# Patient Record
Sex: MALE | Race: WHITE
[De-identification: names, ages, dates, MRNs, and addresses within clinical notes are randomized per-mention and may not be internally consistent; named-entity substitution may affect disease eponyms.]

---

## 2019-04-28 ENCOUNTER — HOSPITAL ENCOUNTER (EMERGENCY)
Dept: HOSPITAL 91 - E/R | Age: 64
Discharge: HOME | End: 2019-04-28
Payer: MEDICAID

## 2019-04-28 ENCOUNTER — HOSPITAL ENCOUNTER (EMERGENCY)
Dept: HOSPITAL 10 - E/R | Age: 64
Discharge: HOME | End: 2019-04-28
Payer: MEDICAID

## 2019-04-28 VITALS — SYSTOLIC BLOOD PRESSURE: 125 MMHG | HEART RATE: 65 BPM | DIASTOLIC BLOOD PRESSURE: 86 MMHG | RESPIRATION RATE: 16 BRPM

## 2019-04-28 VITALS — WEIGHT: 145.31 LBS | BODY MASS INDEX: 28.53 KG/M2 | HEIGHT: 60 IN

## 2019-04-28 DIAGNOSIS — I95.9: Primary | ICD-10-CM

## 2019-04-28 LAB
ADD MAN DIFF?: NO
ALANINE AMINOTRANSFERASE: 18 IU/L (ref 13–69)
ALBUMIN/GLOBULIN RATIO: 1.26
ALBUMIN: 3.8 G/DL (ref 3.3–4.9)
ALKALINE PHOSPHATASE: 82 IU/L (ref 42–121)
ANION GAP: 9 (ref 5–13)
ASPARTATE AMINO TRANSFERASE: 18 IU/L (ref 15–46)
BASOPHIL #: 0.1 10^3/UL (ref 0–0.1)
BASOPHILS %: 0.6 % (ref 0–2)
BILIRUBIN,DIRECT: 0 MG/DL (ref 0–0.2)
BILIRUBIN,TOTAL: 0.3 MG/DL (ref 0.2–1.3)
BLOOD UREA NITROGEN: 13 MG/DL (ref 7–20)
CALCIUM: 9 MG/DL (ref 8.4–10.2)
CARBON DIOXIDE: 25 MMOL/L (ref 21–31)
CHLORIDE: 106 MMOL/L (ref 97–110)
CREATININE: 1.03 MG/DL (ref 0.61–1.24)
EOSINOPHILS #: 0.9 10^3/UL (ref 0–0.5)
EOSINOPHILS %: 6.4 % (ref 0–7)
GLOBULIN: 3 G/DL (ref 1.3–3.2)
GLUCOSE: 190 MG/DL (ref 70–220)
HEMATOCRIT: 36 % (ref 42–52)
HEMOGLOBIN: 12 G/DL (ref 14–18)
IMMATURE GRANS #M: 0.04 10^3/UL (ref 0–0.03)
IMMATURE GRANS % (M): 0.3 % (ref 0–0.43)
INR: 0.99
LYMPHOCYTES #: 4.7 10^3/UL (ref 0.8–2.9)
LYMPHOCYTES %: 35.1 % (ref 15–51)
MEAN CORPUSCULAR HEMOGLOBIN: 28.7 PG (ref 29–33)
MEAN CORPUSCULAR HGB CONC: 33.3 G/DL (ref 32–37)
MEAN CORPUSCULAR VOLUME: 86.1 FL (ref 82–101)
MEAN PLATELET VOLUME: 9.3 FL (ref 7.4–10.4)
MONOCYTE #: 0.9 10^3/UL (ref 0.3–0.9)
MONOCYTES %: 6.7 % (ref 0–11)
NEUTROPHIL #: 6.8 10^3/UL (ref 1.6–7.5)
NEUTROPHILS %: 50.9 % (ref 39–77)
NUCLEATED RED BLOOD CELLS #: 0 10^3/UL (ref 0–0)
NUCLEATED RED BLOOD CELLS%: 0 /100WBC (ref 0–0)
PARTIAL THROMBOPLASTIN TIME: 21.9 SEC (ref 23–35)
PLATELET COUNT: 278 10^3/UL (ref 140–415)
POTASSIUM: 3.6 MMOL/L (ref 3.5–5.1)
PROTIME: 13.2 SEC (ref 11.9–14.9)
PT RATIO: 1
RED BLOOD COUNT: 4.18 10^6/UL (ref 4.7–6.1)
RED CELL DISTRIBUTION WIDTH: 13.2 % (ref 11.5–14.5)
SODIUM: 140 MMOL/L (ref 135–144)
TOTAL PROTEIN: 6.8 G/DL (ref 6.1–8.1)
TROPONIN-I: < 0.012 NG/ML (ref 0–0.12)
WHITE BLOOD COUNT: 13.4 10^3/UL (ref 4.8–10.8)

## 2019-04-28 PROCEDURE — 36415 COLL VENOUS BLD VENIPUNCTURE: CPT

## 2019-04-28 PROCEDURE — 85025 COMPLETE CBC W/AUTO DIFF WBC: CPT

## 2019-04-28 PROCEDURE — 96374 THER/PROPH/DIAG INJ IV PUSH: CPT

## 2019-04-28 PROCEDURE — 71045 X-RAY EXAM CHEST 1 VIEW: CPT

## 2019-04-28 PROCEDURE — 85730 THROMBOPLASTIN TIME PARTIAL: CPT

## 2019-04-28 PROCEDURE — 80053 COMPREHEN METABOLIC PANEL: CPT

## 2019-04-28 PROCEDURE — 70450 CT HEAD/BRAIN W/O DYE: CPT

## 2019-04-28 PROCEDURE — 93005 ELECTROCARDIOGRAM TRACING: CPT

## 2019-04-28 PROCEDURE — 84484 ASSAY OF TROPONIN QUANT: CPT

## 2019-04-28 PROCEDURE — 85610 PROTHROMBIN TIME: CPT

## 2019-04-28 PROCEDURE — 99285 EMERGENCY DEPT VISIT HI MDM: CPT

## 2019-04-28 RX ADMIN — ALBUTEROL SULFATE 1 MG: 2.5 SOLUTION RESPIRATORY (INHALATION) at 15:33

## 2019-04-28 RX ADMIN — ONDANSETRON HYDROCHLORIDE 1 MG: 2 INJECTION, SOLUTION INTRAMUSCULAR; INTRAVENOUS at 14:37

## 2019-04-28 RX ADMIN — IPRATROPIUM BROMIDE 1 MG: 0.5 SOLUTION RESPIRATORY (INHALATION) at 15:33

## 2019-04-28 RX ADMIN — THIAMINE HYDROCHLORIDE 1 MLS/HR: 100 INJECTION, SOLUTION INTRAMUSCULAR; INTRAVENOUS at 14:38

## 2019-04-28 NOTE — ERD
ER Documentation


Chief Complaint


Chief Complaint





syncope at home s/p 2 teeth extractions





HPI


This is a 63-year-old male that presented to the emergency department after he 


had a brief transient loss of consciousness while standing just prior to 


arrival.  This lasted for roughly 2 seconds and the patient had complete 


spontaneous recovery.  He did hit his head on a carpeted surface during the 


syncope episode.  The patient denies any shortness of breath.  The patient had a


procedure earlier today with extraction of 2 molars on the upper right side.  He


had underwent a significant amount of sedation but states he has not eaten for 


over 24 hours.  He denies any chest pain.  He denies a headache.  He denies any 


neck pain.  He has no shortness of breath at rest or exertion.  He denies any 


recent travel or prolonged immobilization.





ROS


All systems reviewed and are negative except as per history of present illness.





Allergies


Allergies:  


Coded Allergies:  


     No Known Allergy (Unverified , 4/28/19)





PMhx/Soc


Medical and Surgical Hx:  pt denies Medical Hx, pt denies Surgical Hx


History of Surgery:  No


Anesthesia Reaction:  No


Hx Neurological Disorder:  No


Hx Respiratory Disorders:  No


Hx Cardiac Disorders:  No


Hx Psychiatric Problems:  No


Hx Miscellaneous Medical Probl:  No


Hx Alcohol Use:  No


Hx Substance Use:  No


Hx Tobacco Use:  No


Smoking Status:  Never smoker





Physical Exam


Vitals





Vital Signs


  Date      Temp  Pulse  Resp  B/P (MAP)   Pulse Ox  O2          O2 Flow    FiO2


Time                                                 Delivery    Rate


   4/28/19           57    16      124/75       100  Room Air


     14:39                           (91)


   4/28/19                     71/51 (58)


     14:22


                               90/69 (76)


   4/28/19  96.4     56    20  71/51 (58)        99


     14:12





Physical Exam


Constitutional:Well-developed. Well-nourished.


HEENT:Normocephalic. Atraumatic.no nasal septal hematoma.  No hemotympanum.  


Pupils were equal round reactive to light.  Very dry mucous membranes.No tonsill


ar exudates.  Blood clot present in the right upper molar region from previous 


extraction of tooth #1 and 2.  No blood present within the posterior pharynx.  


No trismus.


Neck: No nuchal rigidity. No lymphadenopathy. No posterior cervical spine 


tenderness or step-offs.


Respiratory: Not using accessory muscles of respiration.Lungs were clear to 


auscultation bilaterally. No rhonchi. No rales. No wheezing. 


Cardiovascular: Regular rate regular rhythm.No murmurs. No rubs were 


appreciated.S1, S2 normal. Distal pulses are palpable 2+ bilaterally.


GI: Abdomen was soft. Nontender. Non Distended. No pulsatile abdominal masses or


bruits. No rebound. No guarding. Bowel sounds were present and normal. 


Muscle skeletal: Full range of motion of both the upper and lower extremities 


bilaterally.Normal muscle tone.No assymetrical calf tenderness or swelling. 


Skin: No petechia, no purpura. No lesions on the palms or the soles of the feet.


No maculopapular rash.


NEURO: Patient was alert, awake, orientated x3.No facial droop. Gait observed 


and normal with no ataxia.Speech had regular rate and rhythm. No focal 


neurological deficits.


Result Diagram:  


4/28/19 1417                                                                    


           4/28/19 1417





Results 24 hrs





Laboratory Tests


              Test
                                 4/28/19
14:17


              White Blood Count                     13.4 10^3/ul


              Red Blood Count                       4.18 10^6/ul


              Hemoglobin                               12.0 g/dl


              Hematocrit                                  36.0 %


              Mean Corpuscular Volume                    86.1 fl


              Mean Corpuscular Hemoglobin                28.7 pg


              Mean Corpuscular Hemoglobin
Concent     33.3 g/dl 



              Red Cell Distribution Width                 13.2 %


              Platelet Count                         278 10^3/UL


              Mean Platelet Volume                        9.3 fl


              Immature Granulocytes %                    0.300 %


              Neutrophils %                               50.9 %


              Lymphocytes %                               35.1 %


              Monocytes %                                  6.7 %


              Eosinophils %                                6.4 %


              Basophils %                                  0.6 %


              Nucleated Red Blood Cells %            0.0 /100WBC


              Immature Granulocytes #              0.040 10^3/ul


              Neutrophils #                          6.8 10^3/ul


              Lymphocytes #                          4.7 10^3/ul


              Monocytes #                            0.9 10^3/ul


              Eosinophils #                          0.9 10^3/ul


              Basophils #                            0.1 10^3/ul


              Nucleated Red Blood Cells #            0.0 10^3/ul


              Prothrombin Time                          13.2 Sec


              Prothrombin Time Ratio                         1.0


              INR International Normalized
Ratio           0.99 



              Activated Partial
Thromboplast Time      21.9 Sec 



              Sodium Level                            140 mmol/L


              Potassium Level                         3.6 mmol/L


              Chloride Level                          106 mmol/L


              Carbon Dioxide Level                     25 mmol/L


              Anion Gap                                        9


              Blood Urea Nitrogen                       13 mg/dl


              Creatinine                              1.03 mg/dl


              Est Glomerular Filtrat Rate
mL/min   > 60 mL/min 



              Glucose Level                            190 mg/dl


              Calcium Level                            9.0 mg/dl


              Total Bilirubin                          0.3 mg/dl


              Direct Bilirubin                        0.00 mg/dl


              Indirect Bilirubin                       0.3 mg/dl


              Aspartate Amino Transf
(AST/SGOT)         18 IU/L 



              Alanine Aminotransferase
(ALT/SGPT)       18 IU/L 



              Alkaline Phosphatase                       82 IU/L


              Troponin I                           < 0.012 ng/ml


              Total Protein                             6.8 g/dl


              Albumin                                   3.8 g/dl


              Globulin                                 3.00 g/dl


              Albumin/Globulin Ratio                        1.26





Current Medications


 Medications
   Dose
          Sig/Altaf
       Start Time
   Status  Last


 (Trade)       Ordered        Route
 PRN     Stop Time              Admin
Dose


                              Reason                                Admin


 Sodium         1,000 ml @ 
   Q1H STAT
      4/28/19       DC           4/28/19


Chloride       1,000 mls/hr   IV
            14:17
                       14:38



                                             4/28/19 15:16


 Ondansetron    4 mg           ONCE  STAT
    4/28/19       DC           4/28/19


HCl
  (Zofran                 IV
            14:17
                       14:37



Inj)                                         4/28/19 14:19


 Albuterol
     10 mg          ONCE  STAT
    4/28/19       DC       



(Proventil                    NEB
           15:24



0.5%
  (Neb))                                4/28/19 17:59


 Ipratropium
   0.5 mg         ONCE  STAT
    4/28/19       DC       



Bromide
                      NEB
           15:24



(Atrovent                                    4/28/19 17:59


0.02%



(Neb))








Procedures/MDM


The patient presented to the emergency department with a transient loss of 


consciousness with loss of postural tone, suggestive of a syncope episode. The 


differential diagnosis of syncope is vast but my workup considered common benign


disorders to life-threatening processes. Therefore my differential diagnosis 


included but was not limited to reflex-mediated syncope such as vasovagal or 


carotid sinus syncope from coughing, sneezing, micturition, or GI stimulation 


(eg, defecation). Other etiologies in my workup included orthostatic hypotension


which could cause syncope from an abrupt drop in venous return to heart from 


volume depletion. An EKG and cardiac enzymes were obtained to rule out cardiac 


arrhythmias or ischemia. Cardiopulmonary disease such as valvular disease, 


hypertrophic cardiomyopathy, pericardial tamponade, or pulmonary embolism were 


considered as a  factor causing the patients syncope episode. The patient had no


difference in blood pressure in both arms that could suggest aortic dissection 


or subclavian steal syndrome.  Rectal exam was negative for fecal occult blood 


that could suggest GI bleeding.  Ancillary laboratory work was obtained to 


evaluate for metabolic or electrolyte abnormalities.  The patient had no witne


ssed brief tonic movements that could suggest postictal confusion. 





The patient was placed on a cardiac monitor, continuous pulse oximetry and IV 


access established by nursing staff.  The patient has severe hypotension with a 


systolic blood pressure of 70 mmHg.  He immediately had 2 large-bore IV c


atheters were placed and started with 2 L of normal saline.  His blood pressure 


had improved.





I obtained a CT scan the patient said there is no intracerebral hemorrhage mass-


effect or midline shift.  Chest radiograph showed no infiltrates or aspiration 


pneumonia.





I obtained a 12-lead EKG tracing to rule out for atypical myocardial infarction.


 12 Lead EKG tracing ordered and reviewed by myself showed: 


Normal sinus rhythm of 64 bpm and no arrhythmia.  Left axis deviation


PA interval normal.


QRS duration normal.


No ST segment elevation


No ST segment depression. No changes consistent with acute ischemia. 





Observation Note:


Time:   4 hours


Family Hx:   No Hypertension


Evaluation:   Multiple exams showed improving symptoms and no evidence of 


worsening of his symptoms.  He is now ambulatory.  He stated he had significant 


improvement of his symptoms.  I did feel that this was a vasovagal syncope as 


the patient was positive for orthostatics.  The patient was discharged home in 


fair condition. They were instructed to return to the emergency department at 


any time if there was any worsening of their condition. The patient stated they 


would follow up with their PCP in the next 24-48 hours to initiate a suitable 


medication regimen under the care of their PCP as well as to allow their PCP to 


monitor any drug reactions. The patient was discharged home with prescriptions 


after they gave informed consent to the new medication.  They were also fully 


informed by myself on the adverse effects and adverse drug interactions in order


to provide adequate safeguards to prevent possible adverse reactions to 


medications.





Departure


Diagnosis:  


   Primary Impression:  


   Vasovagal syncope


   Additional Impression:  


   Hypotension


   Hypotension type:  unspecified hypotension type  Qualified Codes:  I95.9 - 


   Hypotension, unspecified


Condition:  Fair


Patient Instructions:  Syncope, Vasovagal











RAI LWA MD            Apr 28, 2019 18:04

## 2019-09-03 ENCOUNTER — HOSPITAL ENCOUNTER (EMERGENCY)
Dept: HOSPITAL 10 - E/R | Age: 64
Discharge: HOME | End: 2019-09-03
Payer: MEDICAID

## 2019-09-03 ENCOUNTER — HOSPITAL ENCOUNTER (EMERGENCY)
Dept: HOSPITAL 91 - E/R | Age: 64
Discharge: HOME | End: 2019-09-03
Payer: MEDICAID

## 2019-09-03 VITALS — HEART RATE: 65 BPM | SYSTOLIC BLOOD PRESSURE: 138 MMHG | RESPIRATION RATE: 18 BRPM | DIASTOLIC BLOOD PRESSURE: 82 MMHG

## 2019-09-03 VITALS
HEIGHT: 63 IN | BODY MASS INDEX: 25.78 KG/M2 | WEIGHT: 145.51 LBS | HEIGHT: 63 IN | BODY MASS INDEX: 25.78 KG/M2 | WEIGHT: 145.51 LBS

## 2019-09-03 DIAGNOSIS — R07.9: ICD-10-CM

## 2019-09-03 DIAGNOSIS — R35.0: ICD-10-CM

## 2019-09-03 DIAGNOSIS — R06.02: ICD-10-CM

## 2019-09-03 DIAGNOSIS — R45.1: Primary | ICD-10-CM

## 2019-09-03 LAB
ADD MAN DIFF?: NO
ADD UMIC: YES
ANION GAP: 15 (ref 5–13)
BASOPHIL #: 0.1 10^3/UL (ref 0–0.1)
BASOPHILS %: 0.5 % (ref 0–2)
BLOOD UREA NITROGEN: 12 MG/DL (ref 7–20)
CALCIUM: 10 MG/DL (ref 8.4–10.2)
CARBON DIOXIDE: 19 MMOL/L (ref 21–31)
CHLORIDE: 99 MMOL/L (ref 97–110)
CREATINE KINASE: 203 IU/L (ref 23–200)
CREATININE: 0.81 MG/DL (ref 0.61–1.24)
EOSINOPHILS #: 0.1 10^3/UL (ref 0–0.5)
EOSINOPHILS %: 0.8 % (ref 0–7)
GLUCOSE: 110 MG/DL (ref 70–220)
HEMATOCRIT: 42.7 % (ref 42–52)
HEMOGLOBIN: 14.5 G/DL (ref 14–18)
IMMATURE GRANS #M: 0.06 10^3/UL (ref 0–0.03)
IMMATURE GRANS % (M): 0.4 % (ref 0–0.43)
LYMPHOCYTES #: 3.7 10^3/UL (ref 0.8–2.9)
LYMPHOCYTES %: 25.5 % (ref 15–51)
MEAN CORPUSCULAR HEMOGLOBIN: 26.9 PG (ref 29–33)
MEAN CORPUSCULAR HGB CONC: 34 G/DL (ref 32–37)
MEAN CORPUSCULAR VOLUME: 79.2 FL (ref 82–101)
MEAN PLATELET VOLUME: 9.6 FL (ref 7.4–10.4)
MONOCYTE #: 0.7 10^3/UL (ref 0.3–0.9)
MONOCYTES %: 5 % (ref 0–11)
NEUTROPHIL #: 9.9 10^3/UL (ref 1.6–7.5)
NEUTROPHILS %: 67.8 % (ref 39–77)
NUCLEATED RED BLOOD CELLS #: 0 10^3/UL (ref 0–0)
NUCLEATED RED BLOOD CELLS%: 0 /100WBC (ref 0–0)
PLATELET COUNT: 313 10^3/UL (ref 140–415)
POTASSIUM: 3.2 MMOL/L (ref 3.5–5.1)
RED BLOOD COUNT: 5.39 10^6/UL (ref 4.7–6.1)
RED CELL DISTRIBUTION WIDTH: 13.8 % (ref 11.5–14.5)
SODIUM: 133 MMOL/L (ref 135–144)
TROPONIN-I: 0.04 NG/ML (ref 0–0.12)
UR ASCORBIC ACID: NEGATIVE MG/DL
UR BILIRUBIN (DIP): NEGATIVE MG/DL
UR BLOOD (DIP): (no result) MG/DL
UR CLARITY: CLEAR
UR COLOR: COLORLESS
UR GLUCOSE (DIP): NEGATIVE MG/DL
UR KETONES (DIP): NEGATIVE MG/DL
UR LEUKOCYTE ESTERASE (DIP): NEGATIVE LEU/UL
UR NITRITE (DIP): NEGATIVE MG/DL
UR PH (DIP): 8 (ref 5–9)
UR RBC: 0 /HPF (ref 0–5)
UR SPECIFIC GRAVITY (DIP): 1 (ref 1–1.03)
UR TOTAL PROTEIN (DIP): NEGATIVE MG/DL
UR UROBILINOGEN (DIP): NEGATIVE MG/DL
UR WBC: 0 /HPF (ref 0–5)
WHITE BLOOD COUNT: 14.6 10^3/UL (ref 4.8–10.8)

## 2019-09-03 PROCEDURE — 96360 HYDRATION IV INFUSION INIT: CPT

## 2019-09-03 PROCEDURE — 82550 ASSAY OF CK (CPK): CPT

## 2019-09-03 PROCEDURE — 93005 ELECTROCARDIOGRAM TRACING: CPT

## 2019-09-03 PROCEDURE — 84484 ASSAY OF TROPONIN QUANT: CPT

## 2019-09-03 PROCEDURE — 81001 URINALYSIS AUTO W/SCOPE: CPT

## 2019-09-03 PROCEDURE — 99285 EMERGENCY DEPT VISIT HI MDM: CPT

## 2019-09-03 PROCEDURE — 36415 COLL VENOUS BLD VENIPUNCTURE: CPT

## 2019-09-03 PROCEDURE — 80048 BASIC METABOLIC PNL TOTAL CA: CPT

## 2019-09-03 PROCEDURE — 71045 X-RAY EXAM CHEST 1 VIEW: CPT

## 2019-09-03 PROCEDURE — 85025 COMPLETE CBC W/AUTO DIFF WBC: CPT

## 2019-09-03 RX ADMIN — THIAMINE HYDROCHLORIDE 1 MLS/HR: 100 INJECTION, SOLUTION INTRAMUSCULAR; INTRAVENOUS at 22:01
